# Patient Record
Sex: FEMALE | Race: WHITE | NOT HISPANIC OR LATINO | Employment: UNEMPLOYED | URBAN - NONMETROPOLITAN AREA
[De-identification: names, ages, dates, MRNs, and addresses within clinical notes are randomized per-mention and may not be internally consistent; named-entity substitution may affect disease eponyms.]

---

## 2022-02-04 ENCOUNTER — OFFICE VISIT (OUTPATIENT)
Dept: URGENT CARE | Facility: PHYSICIAN GROUP | Age: 52
End: 2022-02-04
Payer: COMMERCIAL

## 2022-02-04 VITALS
RESPIRATION RATE: 16 BRPM | TEMPERATURE: 98.1 F | DIASTOLIC BLOOD PRESSURE: 80 MMHG | HEART RATE: 84 BPM | WEIGHT: 184 LBS | BODY MASS INDEX: 32.6 KG/M2 | SYSTOLIC BLOOD PRESSURE: 130 MMHG | OXYGEN SATURATION: 98 % | HEIGHT: 63 IN

## 2022-02-04 DIAGNOSIS — J44.1 COPD EXACERBATION (HCC): ICD-10-CM

## 2022-02-04 PROCEDURE — 99204 OFFICE O/P NEW MOD 45 MIN: CPT | Performed by: PHYSICIAN ASSISTANT

## 2022-02-04 RX ORDER — AZITHROMYCIN 250 MG/1
TABLET, FILM COATED ORAL
Qty: 6 TABLET | Refills: 0 | Status: SHIPPED | OUTPATIENT
Start: 2022-02-04 | End: 2023-12-29

## 2022-02-04 RX ORDER — ALBUTEROL SULFATE 90 UG/1
2 AEROSOL, METERED RESPIRATORY (INHALATION) EVERY 6 HOURS PRN
Qty: 8.5 G | Refills: 1 | Status: SHIPPED | OUTPATIENT
Start: 2022-02-04

## 2022-02-04 RX ORDER — PREDNISONE 10 MG/1
40 TABLET ORAL DAILY
Qty: 20 TABLET | Refills: 0 | Status: SHIPPED | OUTPATIENT
Start: 2022-02-04 | End: 2022-02-09

## 2022-02-04 ASSESSMENT — ENCOUNTER SYMPTOMS
DIARRHEA: 0
HEADACHES: 0
SPUTUM PRODUCTION: 1
SORE THROAT: 0
SHORTNESS OF BREATH: 1
COUGH: 1
EYE PAIN: 0
CONSTIPATION: 0
CHILLS: 0
MYALGIAS: 0
ABDOMINAL PAIN: 0
VOMITING: 0
FEVER: 0
NAUSEA: 0

## 2022-02-04 NOTE — PROGRESS NOTES
"Subjective:   Naima oFreman is a 52 y.o. female who presents for Cough (about 2 weeks) and Shortness of Breath      52-year-old female longtime tobacco smoker presents complaining of 2 weeks of cough, congestion shortness of breath.  Began with more typical URI with runny nose and sore throat and cough however the cough seemed to have persisting and she is noticing decreased exercise tolerance.  She reports that normal activities like getting wood for her wood-burning stove gets her out of breath easily.  Does not currently have an albuterol inhaler.  Denies difficulty breathing at rest.  She has not no significant chest pain.  She does have a history of IBS and has noticed some increased abdominal bloating.      Review of Systems   Constitutional: Positive for malaise/fatigue. Negative for chills and fever.   HENT: Negative for congestion, ear pain and sore throat.    Eyes: Negative for pain.   Respiratory: Positive for cough, sputum production and shortness of breath.    Cardiovascular: Negative for chest pain.   Gastrointestinal: Negative for abdominal pain, constipation, diarrhea, nausea and vomiting.   Genitourinary: Negative for dysuria.   Musculoskeletal: Negative for myalgias.   Skin: Negative for rash.   Neurological: Negative for headaches.       Medications, Allergies, and current problem list reviewed today in Epic.     Objective:     /80   Pulse 84   Temp 36.7 °C (98.1 °F) (Temporal)   Resp 16   Ht 1.6 m (5' 3\")   Wt 83.5 kg (184 lb)   SpO2 98%     Physical Exam  Vitals reviewed.   Constitutional:       Appearance: Normal appearance.   HENT:      Head: Normocephalic and atraumatic.      Right Ear: External ear normal.      Left Ear: External ear normal.      Nose: Nose normal.      Mouth/Throat:      Mouth: Mucous membranes are moist.   Eyes:      Conjunctiva/sclera: Conjunctivae normal.   Cardiovascular:      Rate and Rhythm: Normal rate and regular rhythm.   Pulmonary:      Effort: " Pulmonary effort is normal.      Comments: Diminished breath sounds in all quadrants with trace end expiratory wheezes  Skin:     General: Skin is warm and dry.      Capillary Refill: Capillary refill takes less than 2 seconds.   Neurological:      Mental Status: She is alert and oriented to person, place, and time.         Assessment/Plan:     Diagnosis and associated orders:     1. COPD exacerbation (HCC)  albuterol 108 (90 Base) MCG/ACT Aero Soln inhalation aerosol    predniSONE (DELTASONE) 10 MG Tab    azithromycin (ZITHROMAX) 250 MG Tab      Comments/MDM:     • Patient with 2 weeks of persistent cough, increased sputum production recently.  Given her phenotype of longtime tobacco user I would treat this as a COPD exacerbation although she lacks this formal diagnosis.  Trial of bronchodilators, prednisone and azithromycin.  Also recommended an oral antihistamine.  If not showing interval improvement over the next several days return for reevaluation and consideration of imaging.  Expectant management and ER guidance provided         Differential diagnosis, natural history, supportive care, and indications for immediate follow-up discussed.    Advised the patient to follow-up with the primary care physician for recheck, reevaluation, and consideration of further management.    Please note that this dictation was created using voice recognition software. I have made a reasonable attempt to correct obvious errors, but I expect that there are errors of grammar and possibly content that I did not discover before finalizing the note.    This note was electronically signed by Luis Pate PA-C

## 2022-02-18 ENCOUNTER — OFFICE VISIT (OUTPATIENT)
Dept: URGENT CARE | Facility: PHYSICIAN GROUP | Age: 52
End: 2022-02-18
Payer: COMMERCIAL

## 2022-02-18 VITALS
HEART RATE: 86 BPM | DIASTOLIC BLOOD PRESSURE: 80 MMHG | RESPIRATION RATE: 16 BRPM | TEMPERATURE: 99 F | OXYGEN SATURATION: 99 % | WEIGHT: 184 LBS | HEIGHT: 63 IN | BODY MASS INDEX: 32.6 KG/M2 | SYSTOLIC BLOOD PRESSURE: 122 MMHG

## 2022-02-18 DIAGNOSIS — M54.6 ACUTE RIGHT-SIDED THORACIC BACK PAIN: ICD-10-CM

## 2022-02-18 DIAGNOSIS — R10.9 FLANK PAIN: ICD-10-CM

## 2022-02-18 DIAGNOSIS — R10.11 RIGHT UPPER QUADRANT PAIN: ICD-10-CM

## 2022-02-18 LAB
APPEARANCE UR: CLEAR
BILIRUB UR STRIP-MCNC: NEGATIVE MG/DL
COLOR UR AUTO: YELLOW
GLUCOSE UR STRIP.AUTO-MCNC: NEGATIVE MG/DL
KETONES UR STRIP.AUTO-MCNC: NEGATIVE MG/DL
LEUKOCYTE ESTERASE UR QL STRIP.AUTO: NEGATIVE
NITRITE UR QL STRIP.AUTO: NEGATIVE
PH UR STRIP.AUTO: 5.5 [PH] (ref 5–8)
PROT UR QL STRIP: 30 MG/DL
RBC UR QL AUTO: NEGATIVE
SP GR UR STRIP.AUTO: 1.02
UROBILINOGEN UR STRIP-MCNC: 0.2 MG/DL

## 2022-02-18 PROCEDURE — 81002 URINALYSIS NONAUTO W/O SCOPE: CPT | Performed by: PHYSICIAN ASSISTANT

## 2022-02-18 PROCEDURE — 99214 OFFICE O/P EST MOD 30 MIN: CPT | Performed by: PHYSICIAN ASSISTANT

## 2022-02-18 ASSESSMENT — ENCOUNTER SYMPTOMS
FLANK PAIN: 1
SHORTNESS OF BREATH: 1
MYALGIAS: 1
VOMITING: 0
BACK PAIN: 1
DIARRHEA: 0
BOWEL INCONTINENCE: 0
ABDOMINAL PAIN: 1
COUGH: 1

## 2022-02-18 NOTE — PROGRESS NOTES
"Subjective     Naima Foreman is a 52 y.o. female who presents with Back Pain (X 3 days from her back thru to her rib.  Was treated 2 weeks ago for an URI)            Patient is a 52-year-old female who presents to urgent care with worsening right-sided back, flank and abdominal pain the last 3 days.  Patient does report history of similar symptoms off and on for the last few weeks however the last few days in particular this morning have been significantly worse.  Patient updates me and reports that she was recently treated for lower respiratory infection approximately 2 weeks ago of which she completed her course of steroids along with azithromycin of which in general did help with her shortness of breath and noted cough.  Patient does have long history of smoking of which she readily admits that she is cutting back.  She does report baseline shortness of breath with elevation changes in specific activities however in general this is significantly improved since recent course of medications.  Patient denies any new fevers.  Patient does report history of cholecystectomy along with appendectomy.  She does report history of diverticulitis along with gastritis, H. pylori along with \"uterine-ovarian issues.  Patient readily admits that she has history of abdominal pains however rarely severe as the last few days.  Last bowel movement was a few days ago of which was normal.  She does report significantly decreased appetite as she reports food significantly makes symptoms worse.  She does not drink alcohol.    Back Pain  This is a new problem. The current episode started in the past 7 days. The problem occurs constantly. The problem has been gradually worsening since onset. The pain is present in the thoracic spine. Associated symptoms include abdominal pain and pelvic pain. Pertinent negatives include no bladder incontinence, bowel incontinence or chest pain. (Increased urinary hesitancy.)       Review of Systems " "  Respiratory: Positive for cough and shortness of breath.    Cardiovascular: Negative for chest pain.   Gastrointestinal: Positive for abdominal pain. Negative for bowel incontinence, diarrhea and vomiting.   Genitourinary: Positive for flank pain and pelvic pain. Negative for bladder incontinence and hematuria.   Musculoskeletal: Positive for back pain and myalgias.   All other systems reviewed and are negative.             Objective     /80   Pulse 86   Temp 37.2 °C (99 °F) (Temporal)   Resp 16   Ht 1.6 m (5' 3\")   Wt 83.5 kg (184 lb)   SpO2 99%   BMI 32.59 kg/m²    PMH:  has a past medical history of Chronic right shoulder pain (9/4/2015), ETOH abuse, Gastritis (9/4/2015), H. pylori infection (9/4/2015), Iron deficiency (9/4/2015), Menorrhagia, Obesity (BMI 35.0-39.9 without comorbidity) (2/5/2015), Osteoarthritis, and Sinusitis (2/5/2015).  MEDS: Reviewed .   ALLERGIES:   Allergies   Allergen Reactions   • Tegretol [Carbamazepine]      SURGHX: No past surgical history on file.  SOCHX:  reports that she has been smoking. She has been smoking about 0.25 packs per day. She has never used smokeless tobacco. She reports that she does not drink alcohol and does not use drugs.  FH: Family history was reviewed, no pertinent findings to report    Physical Exam  Vitals reviewed.   Constitutional:       General: She is not in acute distress.     Appearance: She is well-developed.   HENT:      Head: Normocephalic and atraumatic.      Right Ear: External ear normal.      Left Ear: External ear normal.   Eyes:      Conjunctiva/sclera: Conjunctivae normal.      Pupils: Pupils are equal, round, and reactive to light.   Neck:      Trachea: No tracheal deviation.   Cardiovascular:      Rate and Rhythm: Normal rate.   Pulmonary:      Effort: Pulmonary effort is normal.   Abdominal:      Comments: Diffusely tender to the epigastric, right upper quadrant, right flank and right lower quadrant regions.  Hypoactive bowel " sounds.  Without rigidity or noted guarding.   Musculoskeletal:      Cervical back: Normal range of motion and neck supple.      Comments: Area of perceived pain is to the right sided lower thoracic region.  Unable to reproduce symptoms with palpation.  Negative CVAT.   Skin:     General: Skin is warm.      Findings: No rash.      Comments: No rash to area exposed during the visit today.    Neurological:      Mental Status: She is alert and oriented to person, place, and time.      Coordination: Coordination normal.   Psychiatric:         Behavior: Behavior normal.         Thought Content: Thought content normal.         Judgment: Judgment normal.                           UA: clear.   Assessment & Plan        1. Right upper quadrant pain  - POCT Urinalysis    2. Flank pain  3. Acute right-sided thoracic back pain            Patient is with exquisite right-sided abdominal pain to upper and lower quadrants.  She also is with reproducible pain through to the back in the epigastric region.  Urinalysis is clear no indication of renal stone at this time.  Discussed at length with patient today unable to fully evaluate patient's complaints today as unfortunately we do not have ability to have timely blood work nor imaging at this current state.  I do feel that patient needs work-up in particular with worsening discomfort and exam findings-patient is agreeable to have evaluation in the ED.  Encourage patient to remain n.p.o. until further instructed.   Patient left in stable condition for further evaluation in the ED.    Please note that this dictation was created using voice recognition software. I have made every reasonable attempt to correct obvious errors, but I expect that there are errors of grammar and possibly content that I did not discover before finalizing the note.

## 2023-12-29 ENCOUNTER — OFFICE VISIT (OUTPATIENT)
Dept: URGENT CARE | Facility: PHYSICIAN GROUP | Age: 53
End: 2023-12-29
Payer: COMMERCIAL

## 2023-12-29 VITALS
HEIGHT: 63 IN | OXYGEN SATURATION: 97 % | RESPIRATION RATE: 16 BRPM | DIASTOLIC BLOOD PRESSURE: 88 MMHG | SYSTOLIC BLOOD PRESSURE: 142 MMHG | HEART RATE: 74 BPM | WEIGHT: 178 LBS | BODY MASS INDEX: 31.54 KG/M2 | TEMPERATURE: 97.4 F

## 2023-12-29 DIAGNOSIS — J01.40 ACUTE NON-RECURRENT PANSINUSITIS: ICD-10-CM

## 2023-12-29 DIAGNOSIS — R06.02 SOB (SHORTNESS OF BREATH): ICD-10-CM

## 2023-12-29 DIAGNOSIS — J06.9 ACUTE UPPER RESPIRATORY INFECTION: ICD-10-CM

## 2023-12-29 LAB
FLUAV RNA SPEC QL NAA+PROBE: NEGATIVE
FLUBV RNA SPEC QL NAA+PROBE: NEGATIVE
RSV RNA SPEC QL NAA+PROBE: NEGATIVE
SARS-COV-2 RNA RESP QL NAA+PROBE: NEGATIVE

## 2023-12-29 PROCEDURE — 3079F DIAST BP 80-89 MM HG: CPT | Performed by: NURSE PRACTITIONER

## 2023-12-29 PROCEDURE — 99213 OFFICE O/P EST LOW 20 MIN: CPT | Performed by: NURSE PRACTITIONER

## 2023-12-29 PROCEDURE — 3077F SYST BP >= 140 MM HG: CPT | Performed by: NURSE PRACTITIONER

## 2023-12-29 PROCEDURE — 0241U POCT CEPHEID COV-2, FLU A/B, RSV - PCR: CPT | Performed by: NURSE PRACTITIONER

## 2023-12-29 RX ORDER — ALBUTEROL SULFATE 90 UG/1
2 AEROSOL, METERED RESPIRATORY (INHALATION) EVERY 6 HOURS PRN
Qty: 8.5 G | Refills: 0 | Status: SHIPPED | OUTPATIENT
Start: 2023-12-29

## 2023-12-29 RX ORDER — BENZONATATE 100 MG/1
100 CAPSULE ORAL 3 TIMES DAILY PRN
Qty: 30 CAPSULE | Refills: 0 | Status: SHIPPED | OUTPATIENT
Start: 2023-12-29 | End: 2024-01-08

## 2023-12-29 RX ORDER — AMOXICILLIN AND CLAVULANATE POTASSIUM 875; 125 MG/1; MG/1
1 TABLET, FILM COATED ORAL 2 TIMES DAILY
Qty: 20 TABLET | Refills: 0 | Status: SHIPPED | OUTPATIENT
Start: 2023-12-29 | End: 2024-01-08

## 2023-12-29 NOTE — PROGRESS NOTES
"Subjective:   Naima Foreman is a 53 y.o. female who presents for Cough (X1 month cough, hard to sleep, rib pain from cough, ears pain, congestion, sore throat, starting to feel like pneumonia, SOB while coughing )      Patient is a 53-year-old female who presents clinic today with 1 month history of waxing and waning cough, bilateral ear pain and pressure, nasal congestion with clear nasal discharge, sore throat, gland tenderness, shortness of breath only while coughing, and occasional rib pain.  She has not had any fevers, chills, nausea, vomiting, diarrhea, chest pain, or wheezing.  She is tried multiple over-the-counter medications without any symptom improvement.    She does state that multiple family numbers within the home have been sick off and on throughout the past month with similar symptoms.    Medications, Allergies, and current problem list reviewed today in Epic.     Objective:     BP (!) 142/88   Pulse 74   Temp 36.3 °C (97.4 °F) (Temporal)   Resp 16   Ht 1.6 m (5' 3\")   Wt 80.7 kg (178 lb)   SpO2 97%     Physical Exam  Vitals reviewed.   Constitutional:       General: She is not in acute distress.     Appearance: Normal appearance. She is ill-appearing. She is not toxic-appearing.   HENT:      Head: Normocephalic.      Right Ear: Tympanic membrane and ear canal normal.      Left Ear: Tympanic membrane and ear canal normal.      Nose: Mucosal edema, congestion and rhinorrhea present. Rhinorrhea is purulent.      Mouth/Throat:      Mouth: Mucous membranes are moist.      Pharynx: No posterior oropharyngeal erythema.   Eyes:      Extraocular Movements: Extraocular movements intact.      Conjunctiva/sclera: Conjunctivae normal.      Pupils: Pupils are equal, round, and reactive to light.   Cardiovascular:      Rate and Rhythm: Normal rate.   Pulmonary:      Effort: Pulmonary effort is normal. No respiratory distress.      Breath sounds: No stridor. No wheezing, rhonchi or rales.      Comments: " Bronchospastic cough  Musculoskeletal:         General: Normal range of motion.      Cervical back: Normal range of motion and neck supple.   Lymphadenopathy:      Head:      Right side of head: Tonsillar adenopathy present.      Left side of head: Tonsillar adenopathy present.      Cervical: No cervical adenopathy.      Comments: Tenderness to palpation over bilateral tonsillar lymph nodes.   Skin:     General: Skin is warm and dry.   Neurological:      Mental Status: She is alert and oriented to person, place, and time.   Psychiatric:         Mood and Affect: Mood normal.         Behavior: Behavior normal.         Thought Content: Thought content normal.         Judgment: Judgment normal.       Results for orders placed or performed in visit on 12/29/23   POCT CEPHEID COV-2, FLU A/B, RSV - PCR   Result Value Ref Range    SARS-CoV-2 by PCR Negative Negative, Invalid    Influenza virus A RNA Negative Negative, Invalid    Influenza virus B, PCR Negative Negative, Invalid    RSV, PCR Negative Negative, Invalid       Assessment/Plan:     Diagnosis and associated orders:     1. Acute non-recurrent pansinusitis  amoxicillin-clavulanate (AUGMENTIN) 875-125 MG Tab      2. Acute upper respiratory infection  POCT CEPHEID COV-2, FLU A/B, RSV - PCR    benzonatate (TESSALON) 100 MG Cap      3. SOB (shortness of breath)  albuterol 108 (90 Base) MCG/ACT Aero Soln inhalation aerosol         Comments/MDM:     POCT COVID, influenza, RSV all negative.  Due to duration of symptoms and physical exam findings we will go ahead and treat for pansinusitis with Augmentin.  Patient states that she has tolerated this well in the past.  Patient was given benzonatate Perles and albuterol to help with cough.  Stressed with patient the importance of following back up in clinic or to the emergency department if symptoms or not improving in the next 3 to 5 days or if they acutely worsen.  Patient was involved with shared decision-making throughout  the exam today and verbalizes understanding regards to plan of care, discharge instructions, and follow-up         Differential diagnosis, natural history, supportive care, and indications for immediate follow-up discussed.    Advised the patient to follow-up with the primary care physician for recheck, reevaluation, and consideration of further management.    I personally reviewed prior external notes and test results pertinent to today's visit as well as additional imaging and testing completed in clinic today.     Please note that this dictation was created using voice recognition software. I have made a reasonable attempt to correct obvious errors, but I expect that there are errors of grammar and possibly content that I did not discover before finalizing the note.